# Patient Record
Sex: FEMALE | Race: WHITE | NOT HISPANIC OR LATINO | Employment: FULL TIME | URBAN - METROPOLITAN AREA
[De-identification: names, ages, dates, MRNs, and addresses within clinical notes are randomized per-mention and may not be internally consistent; named-entity substitution may affect disease eponyms.]

---

## 2020-04-16 ENCOUNTER — NURSE TRIAGE (OUTPATIENT)
Dept: ADMINISTRATIVE | Facility: CLINIC | Age: 50
End: 2020-04-16

## 2020-04-16 DIAGNOSIS — Z20.822 EXPOSURE TO COVID-19 VIRUS: ICD-10-CM

## 2020-04-16 DIAGNOSIS — R05.9 COUGH: Primary | ICD-10-CM

## 2020-04-16 NOTE — TELEPHONE ENCOUNTER
Pt states she was exposed to coronavirus by coworker, last known exposure was 2 days ago. Pt c/o diarrhea x 2 days, slight cough x 3 days, and night sweats x 3 nights. Pt denies fever or SOB. Pt advised per protocol, advised to use [facility], and pt verbalizes understanding.     Reason for Disposition   HIGH RISK patient (e.g., age > 64 years, diabetes, heart or lung disease, weak immune system)    Additional Information   Negative: SEVERE difficulty breathing (e.g., struggling for each breath, speaks in single words)   Negative: MODERATE difficulty breathing (e.g., speaks in phrases, SOB even at rest, pulse 100-120)   Negative: MILD difficulty breathing (e.g., minimal/no SOB at rest, SOB with walking, pulse <100)   Negative: Difficult to awaken or acting confused (e.g., disoriented, slurred speech)   Negative: Bluish (or gray) lips or face now   Negative: Shock suspected (e.g., cold/pale/clammy skin, too weak to stand, low BP, rapid pulse)   Negative: Sounds like a life-threatening emergency to the triager   Negative: SEVERE or constant chest pain (Exception: mild central chest pain, present only when coughing)   Negative: Chest pain   Negative: Patient sounds very sick or weak to the triager   Negative: Fever > 103 F (39.4 C)   Negative: [1] Fever > 101 F (38.3 C) AND [2] age > 60   Negative: [1] Fever > 100.0 F (37.8 C) AND [2] bedridden (e.g., nursing home patient, CVA, chronic illness, recovering from surgery)    Protocols used: CORONAVIRUS (COVID-19) DIAGNOSED OR [id number]

## 2020-04-17 ENCOUNTER — LAB VISIT (OUTPATIENT)
Dept: INTERNAL MEDICINE | Facility: CLINIC | Age: 50
End: 2020-04-17
Payer: OTHER GOVERNMENT

## 2020-04-17 DIAGNOSIS — Z20.822 EXPOSURE TO COVID-19 VIRUS: ICD-10-CM

## 2020-04-17 DIAGNOSIS — R05.9 COUGH: ICD-10-CM

## 2020-04-17 LAB — SARS-COV-2 RNA RESP QL NAA+PROBE: NOT DETECTED

## 2020-04-17 PROCEDURE — U0002 COVID-19 LAB TEST NON-CDC: HCPCS

## 2020-04-18 ENCOUNTER — TELEPHONE (OUTPATIENT)
Dept: FAMILY MEDICINE | Facility: CLINIC | Age: 50
End: 2020-04-18

## 2020-04-18 NOTE — TELEPHONE ENCOUNTER
Spoke to patient to deliver results. Patient demonstrated understanding.   Documented in previous encounter.

## 2020-04-18 NOTE — TELEPHONE ENCOUNTER
-- DO NOT REPLY / DO NOT REPLY ALL --  -- Message is from the [facility]--    COVID-19 Universal Screening Responses:    Shortness of Breath or Difficulty Completing a Sentence: NO  Fever, Cough or Other Respiratory: NO  Close Contact, Exposure or Travel to Identified Countries: NO    Transfer to RN      Chief Complaint:  SEVERE HEADACHE NOW    Caller Information       Type Contact Phone    03/20/2020 12:10 PM Phone (Incoming) Crystal Reinoso (Self) [phone] (Y)          Provider Name:  DR BISI DURON Practice Site Name:  [facility]     Alternative Phone Number:          ----- Message from [facility] sent at 4/18/2020  9:51 AM CDT -----  Contact: pt   Pt called about getting her covid-19 test results pt can be reached at [phone]

## 2024-02-29 ENCOUNTER — OFFICE VISIT (OUTPATIENT)
Dept: FAMILY MEDICINE | Facility: CLINIC | Age: 54
End: 2024-02-29
Payer: COMMERCIAL

## 2024-02-29 VITALS
BODY MASS INDEX: 32.64 KG/M2 | WEIGHT: 166.25 LBS | SYSTOLIC BLOOD PRESSURE: 134 MMHG | HEART RATE: 94 BPM | HEIGHT: 60 IN | OXYGEN SATURATION: 97 % | DIASTOLIC BLOOD PRESSURE: 90 MMHG

## 2024-02-29 DIAGNOSIS — Z90.5 HISTORY OF NEPHRECTOMY: ICD-10-CM

## 2024-02-29 DIAGNOSIS — Z80.0 FAMILY HISTORY OF COLON CANCER: ICD-10-CM

## 2024-02-29 DIAGNOSIS — I10 PRIMARY HYPERTENSION: ICD-10-CM

## 2024-02-29 DIAGNOSIS — Z00.00 ANNUAL PHYSICAL EXAM: Primary | ICD-10-CM

## 2024-02-29 DIAGNOSIS — Z12.31 ENCOUNTER FOR SCREENING MAMMOGRAM FOR MALIGNANT NEOPLASM OF BREAST: ICD-10-CM

## 2024-02-29 DIAGNOSIS — Z12.11 SCREENING FOR MALIGNANT NEOPLASM OF COLON: ICD-10-CM

## 2024-02-29 PROBLEM — F41.9 ANXIETY DISORDER, UNSPECIFIED: Status: ACTIVE | Noted: 2020-04-16

## 2024-02-29 PROCEDURE — 99999 PR PBB SHADOW E&M-NEW PATIENT-LVL IV: CPT | Mod: PBBFAC,,, | Performed by: STUDENT IN AN ORGANIZED HEALTH CARE EDUCATION/TRAINING PROGRAM

## 2024-02-29 PROCEDURE — 1160F RVW MEDS BY RX/DR IN RCRD: CPT | Mod: CPTII,S$GLB,, | Performed by: STUDENT IN AN ORGANIZED HEALTH CARE EDUCATION/TRAINING PROGRAM

## 2024-02-29 PROCEDURE — 1159F MED LIST DOCD IN RCRD: CPT | Mod: CPTII,S$GLB,, | Performed by: STUDENT IN AN ORGANIZED HEALTH CARE EDUCATION/TRAINING PROGRAM

## 2024-02-29 PROCEDURE — 3080F DIAST BP >= 90 MM HG: CPT | Mod: CPTII,S$GLB,, | Performed by: STUDENT IN AN ORGANIZED HEALTH CARE EDUCATION/TRAINING PROGRAM

## 2024-02-29 PROCEDURE — 3008F BODY MASS INDEX DOCD: CPT | Mod: CPTII,S$GLB,, | Performed by: STUDENT IN AN ORGANIZED HEALTH CARE EDUCATION/TRAINING PROGRAM

## 2024-02-29 PROCEDURE — 4010F ACE/ARB THERAPY RXD/TAKEN: CPT | Mod: CPTII,S$GLB,, | Performed by: STUDENT IN AN ORGANIZED HEALTH CARE EDUCATION/TRAINING PROGRAM

## 2024-02-29 PROCEDURE — 3075F SYST BP GE 130 - 139MM HG: CPT | Mod: CPTII,S$GLB,, | Performed by: STUDENT IN AN ORGANIZED HEALTH CARE EDUCATION/TRAINING PROGRAM

## 2024-02-29 PROCEDURE — 99386 PREV VISIT NEW AGE 40-64: CPT | Mod: S$GLB,,, | Performed by: STUDENT IN AN ORGANIZED HEALTH CARE EDUCATION/TRAINING PROGRAM

## 2024-02-29 RX ORDER — LOSARTAN POTASSIUM 25 MG/1
25 TABLET ORAL DAILY
Qty: 90 TABLET | Refills: 3 | Status: SHIPPED | OUTPATIENT
Start: 2024-02-29 | End: 2025-02-28

## 2024-02-29 NOTE — PROGRESS NOTES
History & Physical  [facility]      SUBJECTIVE:     History of Present Illness:  Patient is a 53 y.o. female presents to clinic to establish care. PMH HLD, HTN, prior nephrectomy.    HTN: Has not been on lisinopril in 10 years. S/p nephrectomy in childhood. No CP, SOB or LE edema. FmHx siblings (she is a triplet) with HTN.     Last pap smear- last done 10 years ago  Last mammogram- 1 year ago in February  Immunizations- due for tetanus  Last colonoscopy- never done, fmhx maternal grandmother with colon cancer      Review of patient's allergies indicates:  No Known Allergies    Past Medical History:   Diagnosis Date    Hyperlipidemia     Hypertension      Past Surgical History:   Procedure Laterality Date     SECTION  1994     SECTION  03/15/2010    CHOLECYSTECTOMY      KIDNEY SURGERY Right 1970     Family History   Problem Relation Age of Onset    Fibromyalgia Mother     Heart disease Father     Diabetes Father      Social History     Tobacco Use    Smoking status: Every Day    Smokeless tobacco: Never   Substance Use Topics    Alcohol use: No    Drug use: No        OBJECTIVE:     Vital Signs (Most Recent)  Vitals:    24 1454   BP: (!) 134/90   BP Location: Right arm   Patient Position: Sitting   BP Method: Large (Manual)   Pulse: 94   SpO2: 97%   Weight: 75.4 kg (166 lb 3.6 oz)   Height: 5' (1.524 m)     BMI: 32.46    Physical Exam:  Gen: Well nourished and developed, NAD, appears stated age  HEENT: normocephalic, atraumatic, EOMI, TMs visualized bilaterally not impacted by cerumen, no nasal septal deviation, oropharynx mucosa pink and moist without tonsillar exudates  Neck: trachea midline, no LAD, no thyromegaly  CV: RRR, S1 and S2 present, no murmurs, no LE edema, radial and dorsalis pedis pulses equal and present bilaterally  Resp: breathing comfortably on room air, CTAB, no wheezes or crackles  Abd: Soft, non-distended, non-tender  Skin: No  rashes or abnormal skin lesions, no apparent jaundice or bruising  Neuro: A&Ox4, sensation intact throughout, spontaneous movements, gait smooth kulwant wnl, UE and LE strength 5/5 bilaterally  MSK: Full ROM of all extremities, no tenderness to palpation of shoulders and knees, no joint edema  Psych: Logical thought process, answers questions appropriately      ASSESSMENT/PLAN:   53 y.o.female presents to clinic to establish care/annual exam.    1. Annual physical exam  Due for labs, cancer screening and tetanus. Tetanus declined today. Will return for pap.   - LIPID PANEL; Future  - Comprehensive metabolic panel; Future  - CBC auto differential; Future  - TSH; Future  - HIV 1/2 Ag/Ab (4th Gen); Future  - HEPATITIS C ANTIBODY; Future  - HEMOGLOBIN A1C; Future  - Mammo Digital Screening Bilat w/ [facility]; Future    2. Primary hypertension  Medication, side effects and proper use discussed. Will obtain labs 2 weeks after starting to check potassium. Pt verbalized understanding and was in agreement with the plan.    - losartan (COZAAR) 25 MG tablet; Take 1 tablet (25 mg total) by mouth once daily.  Dispense: 90 tablet; Refill: 3    3. History of nephrectomy  As above  - losartan (COZAAR) 25 MG tablet; Take 1 tablet (25 mg total) by mouth once daily.  Dispense: 90 tablet; Refill: 3    4. Encounter for screening mammogram for malignant neoplasm of breast  Due for screen, agreeable  - Mammo Digital Screening Bilat w/ [facility]; Future    5. Screening for malignant neoplasm of colon  Given FmHx would not qualify for stool testing. Agreeable to getting colonoscopy done.   - Ambulatory referral/consult to Endo Procedure ; Future    6. Family history of colon cancer  As above  - Ambulatory referral/consult to Endo Procedure ; Future      Follow-up: 1 month for HTN and pap    Earnest Valdez DO  Family Medicine

## 2024-03-01 PROBLEM — Z80.0 FAMILY HISTORY OF COLON CANCER: Status: ACTIVE | Noted: 2024-03-01

## 2024-03-01 PROBLEM — Z90.5 HISTORY OF NEPHRECTOMY: Status: ACTIVE | Noted: 2024-03-01

## 2024-03-01 PROBLEM — F41.9 ANXIETY DISORDER, UNSPECIFIED: Status: RESOLVED | Noted: 2020-04-16 | Resolved: 2024-03-01

## 2024-03-14 ENCOUNTER — PATIENT MESSAGE (OUTPATIENT)
Dept: FAMILY MEDICINE | Facility: CLINIC | Age: 54
End: 2024-03-14
Payer: COMMERCIAL

## 2024-03-14 ENCOUNTER — HOSPITAL ENCOUNTER (OUTPATIENT)
Dept: RADIOLOGY | Facility: HOSPITAL | Age: 54
Discharge: HOME | End: 2024-03-14
Attending: STUDENT IN AN ORGANIZED HEALTH CARE EDUCATION/TRAINING PROGRAM
Payer: COMMERCIAL

## 2024-03-14 DIAGNOSIS — Z00.00 ANNUAL PHYSICAL EXAM: ICD-10-CM

## 2024-03-14 DIAGNOSIS — Z12.31 ENCOUNTER FOR SCREENING MAMMOGRAM FOR MALIGNANT NEOPLASM OF BREAST: ICD-10-CM

## 2024-03-14 DIAGNOSIS — E78.00 PURE HYPERCHOLESTEROLEMIA: Primary | ICD-10-CM

## 2024-03-14 PROCEDURE — 77067 SCR MAMMO BI INCL CAD: CPT | Mod: 26,,, | Performed by: RADIOLOGY

## 2024-03-14 PROCEDURE — 77067 SCR MAMMO BI INCL CAD: CPT | Mod: TC

## 2024-03-14 PROCEDURE — 77063 BREAST TOMOSYNTHESIS BI: CPT | Mod: 26,,, | Performed by: RADIOLOGY

## 2024-04-02 ENCOUNTER — LAB VISIT (OUTPATIENT)
Dept: LAB | Facility: HOSPITAL | Age: 54
End: 2024-04-02
Attending: STUDENT IN AN ORGANIZED HEALTH CARE EDUCATION/TRAINING PROGRAM
Payer: COMMERCIAL

## 2024-04-02 ENCOUNTER — OFFICE VISIT (OUTPATIENT)
Dept: FAMILY MEDICINE | Facility: CLINIC | Age: 54
End: 2024-04-02
Payer: COMMERCIAL

## 2024-04-02 VITALS
HEIGHT: 60 IN | HEART RATE: 96 BPM | OXYGEN SATURATION: 97 % | DIASTOLIC BLOOD PRESSURE: 84 MMHG | WEIGHT: 163.38 LBS | SYSTOLIC BLOOD PRESSURE: 122 MMHG | BODY MASS INDEX: 32.08 KG/M2

## 2024-04-02 DIAGNOSIS — I10 PRIMARY HYPERTENSION: ICD-10-CM

## 2024-04-02 DIAGNOSIS — Z72.0 TOBACCO ABUSE: ICD-10-CM

## 2024-04-02 DIAGNOSIS — I10 PRIMARY HYPERTENSION: Primary | ICD-10-CM

## 2024-04-02 DIAGNOSIS — E78.00 PURE HYPERCHOLESTEROLEMIA: ICD-10-CM

## 2024-04-02 DIAGNOSIS — Z90.5 HISTORY OF NEPHRECTOMY: ICD-10-CM

## 2024-04-02 DIAGNOSIS — N84.1 CERVICAL POLYP: ICD-10-CM

## 2024-04-02 DIAGNOSIS — Z12.4 ENCOUNTER FOR PAPANICOLAOU SMEAR FOR CERVICAL CANCER SCREENING: ICD-10-CM

## 2024-04-02 LAB
ANION GAP SERPL CALC-SCNC: 9 MMOL/L (ref 8–16)
BUN SERPL-MCNC: 17 MG/DL (ref 6–20)
CALCIUM SERPL-MCNC: 9.6 MG/DL (ref 8.7–10.5)
CHLORIDE SERPL-SCNC: 109 MMOL/L (ref 95–110)
CO2 SERPL-SCNC: 23 MMOL/L (ref 23–29)
CREAT SERPL-MCNC: 1.3 MG/DL (ref 0.5–1.4)
EST. GFR  (NO RACE VARIABLE): 48.6 ML/MIN/1.73 M^2
GLUCOSE SERPL-MCNC: 92 MG/DL (ref 70–110)
POTASSIUM SERPL-SCNC: 4.2 MMOL/L (ref 3.5–5.1)
SODIUM SERPL-SCNC: 141 MMOL/L (ref 136–145)

## 2024-04-02 PROCEDURE — G2211 COMPLEX E/M VISIT ADD ON: HCPCS | Mod: S$GLB,,, | Performed by: STUDENT IN AN ORGANIZED HEALTH CARE EDUCATION/TRAINING PROGRAM

## 2024-04-02 PROCEDURE — 3079F DIAST BP 80-89 MM HG: CPT | Mod: CPTII,S$GLB,, | Performed by: STUDENT IN AN ORGANIZED HEALTH CARE EDUCATION/TRAINING PROGRAM

## 2024-04-02 PROCEDURE — 80048 BASIC METABOLIC PNL TOTAL CA: CPT | Performed by: STUDENT IN AN ORGANIZED HEALTH CARE EDUCATION/TRAINING PROGRAM

## 2024-04-02 PROCEDURE — 3074F SYST BP LT 130 MM HG: CPT | Mod: CPTII,S$GLB,, | Performed by: STUDENT IN AN ORGANIZED HEALTH CARE EDUCATION/TRAINING PROGRAM

## 2024-04-02 PROCEDURE — 4010F ACE/ARB THERAPY RXD/TAKEN: CPT | Mod: CPTII,S$GLB,, | Performed by: STUDENT IN AN ORGANIZED HEALTH CARE EDUCATION/TRAINING PROGRAM

## 2024-04-02 PROCEDURE — 3008F BODY MASS INDEX DOCD: CPT | Mod: CPTII,S$GLB,, | Performed by: STUDENT IN AN ORGANIZED HEALTH CARE EDUCATION/TRAINING PROGRAM

## 2024-04-02 PROCEDURE — 88175 CYTOPATH C/V AUTO FLUID REDO: CPT | Performed by: STUDENT IN AN ORGANIZED HEALTH CARE EDUCATION/TRAINING PROGRAM

## 2024-04-02 PROCEDURE — 1160F RVW MEDS BY RX/DR IN RCRD: CPT | Mod: CPTII,S$GLB,, | Performed by: STUDENT IN AN ORGANIZED HEALTH CARE EDUCATION/TRAINING PROGRAM

## 2024-04-02 PROCEDURE — 36415 COLL VENOUS BLD VENIPUNCTURE: CPT | Mod: PO | Performed by: STUDENT IN AN ORGANIZED HEALTH CARE EDUCATION/TRAINING PROGRAM

## 2024-04-02 PROCEDURE — 1159F MED LIST DOCD IN RCRD: CPT | Mod: CPTII,S$GLB,, | Performed by: STUDENT IN AN ORGANIZED HEALTH CARE EDUCATION/TRAINING PROGRAM

## 2024-04-02 PROCEDURE — 87624 HPV HI-RISK TYP POOLED RSLT: CPT | Performed by: STUDENT IN AN ORGANIZED HEALTH CARE EDUCATION/TRAINING PROGRAM

## 2024-04-02 PROCEDURE — 3044F HG A1C LEVEL LT 7.0%: CPT | Mod: CPTII,S$GLB,, | Performed by: STUDENT IN AN ORGANIZED HEALTH CARE EDUCATION/TRAINING PROGRAM

## 2024-04-02 PROCEDURE — 1034F CURRENT TOBACCO SMOKER: CPT | Mod: CPTII,S$GLB,, | Performed by: STUDENT IN AN ORGANIZED HEALTH CARE EDUCATION/TRAINING PROGRAM

## 2024-04-02 PROCEDURE — 99999 PR PBB SHADOW E&M-EST. PATIENT-LVL IV: CPT | Mod: PBBFAC,,, | Performed by: STUDENT IN AN ORGANIZED HEALTH CARE EDUCATION/TRAINING PROGRAM

## 2024-04-02 PROCEDURE — 99214 OFFICE O/P EST MOD 30 MIN: CPT | Mod: S$GLB,,, | Performed by: STUDENT IN AN ORGANIZED HEALTH CARE EDUCATION/TRAINING PROGRAM

## 2024-04-02 RX ORDER — ATORVASTATIN CALCIUM 40 MG/1
40 TABLET, FILM COATED ORAL DAILY
Qty: 90 TABLET | Refills: 3 | Status: SHIPPED | OUTPATIENT
Start: 2024-04-02 | End: 2025-04-02

## 2024-04-02 NOTE — PROGRESS NOTES
Progress Note  [facility]    Subjective:     Janelle Mireles is a 53 y.o. year old female who presents to clinic for pap.    Last pap 10+ years ago. LMP May, 2023. No concern for STI. Sexually active with .     HTN: Does not check BP at home. Doing well on losartan.     HLD: .4 after last appt. Had been on lipitor in the past.   The 10-year ASCVD risk score (Gabriela PONCE, et al., 2019) is: 8.3%    Values used to calculate the score:      Age: 53 years      Sex: Female      Is Non- : No      Diabetic: No      Tobacco smoker: Yes      Systolic Blood Pressure: 122 mmHg      Is BP treated: Yes      HDL Cholesterol: 51 mg/dL      Total Cholesterol: 261 mg/dL     Tobacco: Smoking 1/2 ppd x10 years. Working on cutting back. Trying to convince  to quit so they can quit together.     Patient Active Problem List    Diagnosis Date Noted    Family history of colon cancer 2024    History of nephrectomy 2024    S/P laparoscopic cholecystectomy 2016    Primary hypertension 2016    Pure hypercholesterolemia 2016        Review of patient's allergies indicates:  No Known Allergies     Past Medical History:   Diagnosis Date    Hyperlipidemia     Hypertension       Past Surgical History:   Procedure Laterality Date     SECTION  1994     SECTION  03/15/2010    CHOLECYSTECTOMY      NEPHRECTOMY Right 1970      Family History   Problem Relation Age of Onset    Fibromyalgia Mother     Heart disease Father     Diabetes Father     Colon cancer Maternal Grandmother       Social History     Tobacco Use    Smoking status: Every Day    Smokeless tobacco: Never   Substance Use Topics    Alcohol use: No        Objective:     Vitals:    24 1431   BP: 122/84   BP Location: Right arm   Patient Position: Sitting   BP Method: Large (Manual)   Pulse: 96   SpO2: 97%   Weight: 74.1 kg (163 lb 5.8 oz)   Height: 5' (1.524 m)      BMI: 31.90    Gen: No apparent distress, well nourished and developed, appears stated age  CV: RRR, S1 and S2 present, no LE edema  Resp: CTAB, normal respiratory effort  GYN: External genitalia wnl, no apparent masses or lesions, bimanual examination performed and tolerated by the patient, no cervical motion tenderness. Speculum examination performed and tolerated by the patient. Cervical os closed and with polyp at 1 o'clock position. No abnormal vaginal discharge appreciated. Pap test obtained, pt tolerated procedure well.     Pelvic examination performed by Earnest Valdez DO in the presence of a female chaperone.      Assessment/Plan:     Janelle Mireles is a 53 y.o. year old female who presents to clinic for pap    1. Primary hypertension  Well controlled on current regimen.  Will check potassium today.   - BASIC METABOLIC PANEL; Future    2. Encounter for Papanicolaou smear for cervical cancer screening  If negative repeat in 5 years.   - Liquid-Based Pap Smear, Screening  - HPV High Risk Genotypes, PCR    3. Cervical polyp  Benign in appearance.     4. Pure hypercholesterolemia  Medication, side effects and proper use discussed. ASCVD 9.9% so recommend treatment. Pt verbalized understanding and was in agreement with the plan.   - atorvastatin (LIPITOR) 40 MG tablet; Take 1 tablet (40 mg total) by mouth once daily.  Dispense: 90 tablet; Refill: 3    5. Tobacco abuse  Tobacco cessation counseling provided. Information on Wellbutrin provided.     Follow-up: 4 months for HTN and continued tobacco cessation        Earnest Valdez DO  Family Medicine

## 2024-04-05 LAB
HPV HR 12 DNA SPEC QL NAA+PROBE: NEGATIVE
HPV16 AG SPEC QL: NEGATIVE
HPV18 DNA SPEC QL NAA+PROBE: NEGATIVE

## 2024-04-09 LAB
FINAL PATHOLOGIC DIAGNOSIS: NORMAL
Lab: NORMAL

## 2024-09-12 ENCOUNTER — TELEPHONE (OUTPATIENT)
Dept: INTERNAL MEDICINE | Facility: CLINIC | Age: 54
End: 2024-09-12
Payer: COMMERCIAL

## 2024-09-12 NOTE — TELEPHONE ENCOUNTER
Left message for Pt to see if  she is available for a virtual  follow up appointment  this morning. Please transfer call to [phone] to schedule

## 2024-09-13 ENCOUNTER — TELEPHONE (OUTPATIENT)
Dept: FAMILY MEDICINE | Facility: CLINIC | Age: 54
End: 2024-09-13
Payer: COMMERCIAL

## 2024-09-13 ENCOUNTER — PATIENT MESSAGE (OUTPATIENT)
Dept: FAMILY MEDICINE | Facility: CLINIC | Age: 54
End: 2024-09-13
Payer: COMMERCIAL

## 2024-09-13 NOTE — TELEPHONE ENCOUNTER
Called patient to schedule an appointment with Dr. Valdez on Monday. No answer, left VM to call back the office to schedule an appointment.

## 2024-09-16 ENCOUNTER — TELEPHONE (OUTPATIENT)
Dept: FAMILY MEDICINE | Facility: CLINIC | Age: 54
End: 2024-09-16
Payer: COMMERCIAL

## 2024-09-16 NOTE — TELEPHONE ENCOUNTER
Called and spoke with patient in regards to her appt 9/16 pt states someone from the office called her and stated the appt 9/16 was a mistake and her new appt is October 1st

## 2024-10-04 ENCOUNTER — OFFICE VISIT (OUTPATIENT)
Dept: FAMILY MEDICINE | Facility: CLINIC | Age: 54
End: 2024-10-04
Payer: COMMERCIAL

## 2024-10-04 VITALS
HEART RATE: 102 BPM | DIASTOLIC BLOOD PRESSURE: 100 MMHG | HEIGHT: 60 IN | BODY MASS INDEX: 34.24 KG/M2 | WEIGHT: 174.38 LBS | OXYGEN SATURATION: 97 % | SYSTOLIC BLOOD PRESSURE: 160 MMHG

## 2024-10-04 DIAGNOSIS — Z80.0 FAMILY HISTORY OF COLON CANCER: ICD-10-CM

## 2024-10-04 DIAGNOSIS — I10 PRIMARY HYPERTENSION: Primary | ICD-10-CM

## 2024-10-04 DIAGNOSIS — Z00.00 ANNUAL PHYSICAL EXAM: Primary | ICD-10-CM

## 2024-10-04 DIAGNOSIS — E78.00 PURE HYPERCHOLESTEROLEMIA: ICD-10-CM

## 2024-10-04 DIAGNOSIS — Z72.0 TOBACCO ABUSE: ICD-10-CM

## 2024-10-04 DIAGNOSIS — I10 PRIMARY HYPERTENSION: ICD-10-CM

## 2024-10-04 PROCEDURE — 99999 PR PBB SHADOW E&M-EST. PATIENT-LVL III: CPT | Mod: PBBFAC,,, | Performed by: STUDENT IN AN ORGANIZED HEALTH CARE EDUCATION/TRAINING PROGRAM

## 2024-10-04 NOTE — PROGRESS NOTES
Progress Note  [facility]    Subjective:     Janelle Mireles is a 54 y.o. year old female with current diagnoses of HLD, HTN, prior nephrectomy, tobacco use who presents to clinic for f/u    HTN: Did not take medication this morning.     Tobacco: Plans to quit smoking with her  later this year.     Colonoscopy- previously ordered. Did get called to schedule but has decided not to get this done at this time. Is thinking about it.     Patient Active Problem List    Diagnosis Date Noted    Family history of colon cancer 2024    History of nephrectomy 2024    S/P laparoscopic cholecystectomy 2016    Primary hypertension 2016    Pure hypercholesterolemia 2016        Review of patient's allergies indicates:  No Known Allergies     Past Medical History:   Diagnosis Date    Hyperlipidemia     Hypertension       Past Surgical History:   Procedure Laterality Date     SECTION  1994     SECTION  03/15/2010    CHOLECYSTECTOMY      NEPHRECTOMY Right 1970    TUBAL LIGATION        Family History   Problem Relation Name Age of Onset    Fibromyalgia Mother      Heart disease Father Jed     Diabetes Father Jed     Colon cancer Maternal Grandmother        Social History     Tobacco Use    Smoking status: Every Day     Current packs/day: 0.50     Average packs/day: 0.5 packs/day for 11.4 years (5.7 ttl pk-yrs)     Types: Cigarettes     Start date: 5/10/2013     Passive exposure: Current    Smokeless tobacco: Never   Substance Use Topics    Alcohol use: No        Objective:     Vitals:    10/04/24 0840 10/04/24 0844   BP: (!) 160/100 (!) 160/100   BP Location: Right arm    Patient Position: Sitting    Pulse: 102    SpO2: 97%    Weight: 79.1 kg (174 lb 6.1 oz)    Height: 5' (1.524 m)      BMI: 34.06    Gen: No apparent distress, well nourished and developed, appears stated age  CV: RRR, S1 and S2 present, no LE edema  Resp: CTAB, normal  respiratory effort      Assessment/Plan:     Janelle Mireles is a 54 y.o. year old female who presents to clinic for f/u    1. Primary hypertension (Primary)  Home BP monitoring discussed. Will do E-visit in 2 weeks to ensure BP well controlled at home on medications and can adjust dosing if not. Pt verbalized understanding and was in agreement with the plan.    - [facility] E-Visit    2. Tobacco abuse  Tobacco cessation counseling provided.      3. Family history of colon cancer  Stressed importance of screening with colonoscopy given at increased risk with family history. Pt verbalized understanding.      Follow-up: E-visit for HTN then for annual exam with fasting labs prior    I spent a total of 30 minutes on the day of the visit.This includes face to face time and non-face to face time preparing to see the patient (eg, review of tests), obtaining and/or reviewing separately obtained history, documenting clinical information in the electronic or other health record, independently interpreting results and communicating results to the patient/family/caregiver, or care coordinator.      Earnest Valdez, DO  Family Medicine

## 2025-03-31 ENCOUNTER — LAB VISIT (OUTPATIENT)
Dept: LAB | Facility: HOSPITAL | Age: 55
End: 2025-03-31
Attending: STUDENT IN AN ORGANIZED HEALTH CARE EDUCATION/TRAINING PROGRAM
Payer: COMMERCIAL

## 2025-03-31 DIAGNOSIS — I10 PRIMARY HYPERTENSION: ICD-10-CM

## 2025-03-31 DIAGNOSIS — E78.00 PURE HYPERCHOLESTEROLEMIA: ICD-10-CM

## 2025-03-31 DIAGNOSIS — Z00.00 ANNUAL PHYSICAL EXAM: ICD-10-CM

## 2025-03-31 LAB
ABSOLUTE EOSINOPHIL (OHS): 0.12 K/UL
ABSOLUTE MONOCYTE (OHS): 0.62 K/UL (ref 0.3–1)
ABSOLUTE NEUTROPHIL COUNT (OHS): 3.95 K/UL (ref 1.8–7.7)
ALBUMIN SERPL BCP-MCNC: 3.8 G/DL (ref 3.5–5.2)
ALP SERPL-CCNC: 89 UNIT/L (ref 40–150)
ALT SERPL W/O P-5'-P-CCNC: 19 UNIT/L (ref 10–44)
ANION GAP (OHS): 8 MMOL/L (ref 8–16)
AST SERPL-CCNC: 22 UNIT/L (ref 11–45)
BASOPHILS # BLD AUTO: 0.08 K/UL
BASOPHILS NFR BLD AUTO: 1.3 %
BILIRUB SERPL-MCNC: 0.4 MG/DL (ref 0.1–1)
BUN SERPL-MCNC: 17 MG/DL (ref 6–20)
CALCIUM SERPL-MCNC: 9.1 MG/DL (ref 8.7–10.5)
CHLORIDE SERPL-SCNC: 113 MMOL/L (ref 95–110)
CHOLEST SERPL-MCNC: 129 MG/DL (ref 120–199)
CHOLEST/HDLC SERPL: 2.9 {RATIO} (ref 2–5)
CO2 SERPL-SCNC: 23 MMOL/L (ref 23–29)
CREAT SERPL-MCNC: 1 MG/DL (ref 0.5–1.4)
EAG (OHS): 108 MG/DL (ref 68–131)
ERYTHROCYTE [DISTWIDTH] IN BLOOD BY AUTOMATED COUNT: 12.6 % (ref 11.5–14.5)
GFR SERPLBLD CREATININE-BSD FMLA CKD-EPI: >60 ML/MIN/1.73/M2
GLUCOSE SERPL-MCNC: 105 MG/DL (ref 70–110)
HBA1C MFR BLD: 5.4 % (ref 4–5.6)
HCT VFR BLD AUTO: 44.8 % (ref 37–48.5)
HDLC SERPL-MCNC: 44 MG/DL (ref 40–75)
HDLC SERPL: 34.1 % (ref 20–50)
HGB BLD-MCNC: 14.5 GM/DL (ref 12–16)
IMM GRANULOCYTES # BLD AUTO: 0.02 K/UL (ref 0–0.04)
IMM GRANULOCYTES NFR BLD AUTO: 0.3 % (ref 0–0.5)
LDLC SERPL CALC-MCNC: 67 MG/DL (ref 63–159)
LYMPHOCYTES # BLD AUTO: 1.42 K/UL (ref 1–4.8)
MCH RBC QN AUTO: 30.4 PG (ref 27–31)
MCHC RBC AUTO-ENTMCNC: 32.4 G/DL (ref 32–36)
MCV RBC AUTO: 94 FL (ref 82–98)
NONHDLC SERPL-MCNC: 85 MG/DL
NUCLEATED RBC (/100WBC) (OHS): 0 /100 WBC
PLATELET # BLD AUTO: 178 K/UL (ref 150–450)
PMV BLD AUTO: 11.3 FL (ref 9.2–12.9)
POTASSIUM SERPL-SCNC: 4 MMOL/L (ref 3.5–5.1)
PROT SERPL-MCNC: 7.1 GM/DL (ref 6–8.4)
RBC # BLD AUTO: 4.77 M/UL (ref 4–5.4)
RELATIVE EOSINOPHIL (OHS): 1.9 %
RELATIVE LYMPHOCYTE (OHS): 22.9 % (ref 18–48)
RELATIVE MONOCYTE (OHS): 10 % (ref 4–15)
RELATIVE NEUTROPHIL (OHS): 63.6 % (ref 38–73)
SODIUM SERPL-SCNC: 144 MMOL/L (ref 136–145)
TRIGL SERPL-MCNC: 90 MG/DL (ref 30–150)
TSH SERPL-ACNC: 1.45 UIU/ML (ref 0.4–4)
WBC # BLD AUTO: 6.21 K/UL (ref 3.9–12.7)

## 2025-03-31 PROCEDURE — 85025 COMPLETE CBC W/AUTO DIFF WBC: CPT

## 2025-03-31 PROCEDURE — 84443 ASSAY THYROID STIM HORMONE: CPT

## 2025-03-31 PROCEDURE — 84075 ASSAY ALKALINE PHOSPHATASE: CPT

## 2025-03-31 PROCEDURE — 36415 COLL VENOUS BLD VENIPUNCTURE: CPT | Mod: PO

## 2025-03-31 PROCEDURE — 83036 HEMOGLOBIN GLYCOSYLATED A1C: CPT

## 2025-03-31 PROCEDURE — 80061 LIPID PANEL: CPT

## 2025-04-01 ENCOUNTER — RESULTS FOLLOW-UP (OUTPATIENT)
Dept: FAMILY MEDICINE | Facility: CLINIC | Age: 55
End: 2025-04-01

## 2025-04-10 DIAGNOSIS — E78.00 PURE HYPERCHOLESTEROLEMIA: ICD-10-CM

## 2025-04-10 RX ORDER — ATORVASTATIN CALCIUM 40 MG/1
40 TABLET, FILM COATED ORAL
Qty: 90 TABLET | Refills: 1 | Status: SHIPPED | OUTPATIENT
Start: 2025-04-10

## 2025-04-10 NOTE — TELEPHONE ENCOUNTER
No care due was identified.  [facility] Embedded Care Due Messages. Reference number: [id number].   4/10/2025 5:04:04 AM CDT

## 2025-04-10 NOTE — TELEPHONE ENCOUNTER
Refill Decision Note   Janelle Mireles  is requesting a refill authorization.  Brief Assessment and Rationale for Refill:  Approve     Medication Therapy Plan:         Comments:     Note composed:8:17 AM 04/10/2025

## 2025-07-08 ENCOUNTER — PATIENT OUTREACH (OUTPATIENT)
Dept: ADMINISTRATIVE | Facility: HOSPITAL | Age: 55
End: 2025-07-08
Payer: COMMERCIAL

## 2025-07-08 DIAGNOSIS — Z12.31 SCREENING MAMMOGRAM FOR BREAST CANCER: Primary | ICD-10-CM

## 2025-07-08 NOTE — PROGRESS NOTES
07/08/2025  VB chart audit performed. Care Everywhere updates requested and reviewed  Overdue HM topic chart audit and/or requested. LINKS triggered and reconciled. Media reviewed Lvm/portal sent regarding overdue health topics

## 2025-08-01 DIAGNOSIS — I10 PRIMARY HYPERTENSION: ICD-10-CM

## 2025-08-01 DIAGNOSIS — Z90.5 HISTORY OF NEPHRECTOMY: ICD-10-CM

## 2025-08-01 RX ORDER — LOSARTAN POTASSIUM 25 MG/1
25 TABLET ORAL DAILY
Qty: 90 TABLET | Refills: 0 | Status: SHIPPED | OUTPATIENT
Start: 2025-08-01